# Patient Record
Sex: MALE | URBAN - METROPOLITAN AREA
[De-identification: names, ages, dates, MRNs, and addresses within clinical notes are randomized per-mention and may not be internally consistent; named-entity substitution may affect disease eponyms.]

---

## 2019-03-07 ENCOUNTER — OFFICE VISIT (OUTPATIENT)
Dept: URBAN - METROPOLITAN AREA CLINIC 48 | Facility: CLINIC | Age: 69
End: 2019-03-07
Payer: MEDICARE

## 2019-03-07 DIAGNOSIS — H25.13 AGE-RELATED NUCLEAR CATARACT, BILATERAL: Primary | ICD-10-CM

## 2019-03-07 PROCEDURE — 92004 COMPRE OPH EXAM NEW PT 1/>: CPT | Performed by: OPHTHALMOLOGY

## 2019-03-07 ASSESSMENT — INTRAOCULAR PRESSURE
OS: 15
OD: 14

## 2019-03-07 ASSESSMENT — KERATOMETRY
OS: 42.75
OD: 42.63

## 2019-03-07 ASSESSMENT — VISUAL ACUITY
OS: 20/25
OD: 20/30

## 2019-03-07 NOTE — IMPRESSION/PLAN
Impression: Age-related nuclear cataract, bilateral: H25.13. Plan: Discussed and reviewed diagnosis with patient, understood by patient. Cataract is not visually significant to patient, will continue to monitor. Call if [address] worsens. 

RTC PRN